# Patient Record
Sex: MALE | Race: WHITE | NOT HISPANIC OR LATINO | Employment: FULL TIME | URBAN - METROPOLITAN AREA
[De-identification: names, ages, dates, MRNs, and addresses within clinical notes are randomized per-mention and may not be internally consistent; named-entity substitution may affect disease eponyms.]

---

## 2017-06-06 ENCOUNTER — HISTORICAL (OUTPATIENT)
Dept: INTENSIVE CARE | Facility: HOSPITAL | Age: 57
End: 2017-06-06

## 2017-12-05 ENCOUNTER — HISTORICAL (OUTPATIENT)
Dept: ADMINISTRATIVE | Facility: HOSPITAL | Age: 57
End: 2017-12-05

## 2017-12-05 LAB
CHOLEST SERPL-MCNC: 207 MG/DL (ref 0–200)
CHOLEST/HDLC SERPL: 3.9 {RATIO} (ref 0–5)
HDLC SERPL-MCNC: 53 MG/DL (ref 35–60)
LDLC SERPL CALC-MCNC: 136 MG/DL (ref 0–129)
TRIGL SERPL-MCNC: 91 MG/DL (ref 30–150)
VLDLC SERPL CALC-MCNC: 18 MG/DL

## 2018-08-15 ENCOUNTER — HISTORICAL (OUTPATIENT)
Dept: ADMINISTRATIVE | Facility: HOSPITAL | Age: 58
End: 2018-08-15

## 2018-08-15 LAB
CHOLEST SERPL-MCNC: 213 MG/DL (ref 0–200)
CHOLEST/HDLC SERPL: 4.1 {RATIO} (ref 0–5)
HDLC SERPL-MCNC: 52 MG/DL (ref 35–60)
LDLC SERPL CALC-MCNC: 127 MG/DL (ref 0–129)
TRIGL SERPL-MCNC: 168 MG/DL (ref 30–150)
VLDLC SERPL CALC-MCNC: 34 MG/DL

## 2019-05-21 ENCOUNTER — HISTORICAL (OUTPATIENT)
Dept: ADMINISTRATIVE | Facility: HOSPITAL | Age: 59
End: 2019-05-21

## 2019-05-21 LAB
CHOLEST SERPL-MCNC: 200 MG/DL (ref 0–200)
CHOLEST/HDLC SERPL: 3.3 {RATIO} (ref 0–5)
HDLC SERPL-MCNC: 60 MG/DL (ref 35–60)
LDLC SERPL CALC-MCNC: 114 MG/DL (ref 0–129)
TRIGL SERPL-MCNC: 128 MG/DL (ref 30–150)
VLDLC SERPL CALC-MCNC: 26 MG/DL

## 2020-09-23 ENCOUNTER — HISTORICAL (OUTPATIENT)
Dept: INTENSIVE CARE | Facility: HOSPITAL | Age: 60
End: 2020-09-23

## 2022-08-10 DIAGNOSIS — J31.0 RHINITIS, UNSPECIFIED TYPE: Primary | ICD-10-CM

## 2022-09-06 ENCOUNTER — PROCEDURE VISIT (OUTPATIENT)
Dept: SLEEP MEDICINE | Facility: HOSPITAL | Age: 62
End: 2022-09-06
Attending: SPECIALIST
Payer: COMMERCIAL

## 2022-09-06 ENCOUNTER — OFFICE VISIT (OUTPATIENT)
Dept: NEUROLOGY | Facility: CLINIC | Age: 62
End: 2022-09-06
Payer: COMMERCIAL

## 2022-09-06 VITALS
HEIGHT: 71 IN | DIASTOLIC BLOOD PRESSURE: 82 MMHG | BODY MASS INDEX: 26.6 KG/M2 | SYSTOLIC BLOOD PRESSURE: 138 MMHG | WEIGHT: 190 LBS

## 2022-09-06 DIAGNOSIS — G47.33 OBSTRUCTIVE SLEEP APNEA SYNDROME: ICD-10-CM

## 2022-09-06 DIAGNOSIS — J31.0 RHINITIS, UNSPECIFIED TYPE: ICD-10-CM

## 2022-09-06 DIAGNOSIS — Z78.9 INTOLERANCE OF CONTINUOUS POSITIVE AIRWAY PRESSURE (CPAP) VENTILATION: ICD-10-CM

## 2022-09-06 DIAGNOSIS — G47.33 OBSTRUCTIVE SLEEP APNEA SYNDROME: Primary | ICD-10-CM

## 2022-09-06 PROCEDURE — 99214 PR OFFICE/OUTPT VISIT, EST, LEVL IV, 30-39 MIN: ICD-10-PCS | Mod: S$GLB,,, | Performed by: SPECIALIST

## 2022-09-06 PROCEDURE — 3079F PR MOST RECENT DIASTOLIC BLOOD PRESSURE 80-89 MM HG: ICD-10-PCS | Mod: CPTII,S$GLB,, | Performed by: SPECIALIST

## 2022-09-06 PROCEDURE — 3075F PR MOST RECENT SYSTOLIC BLOOD PRESS GE 130-139MM HG: ICD-10-PCS | Mod: CPTII,S$GLB,, | Performed by: SPECIALIST

## 2022-09-06 PROCEDURE — 99999 PR PBB SHADOW E&M-EST. PATIENT-LVL III: ICD-10-PCS | Mod: PBBFAC,,, | Performed by: SPECIALIST

## 2022-09-06 PROCEDURE — 4010F PR ACE/ARB THEARPY RXD/TAKEN: ICD-10-PCS | Mod: CPTII,S$GLB,, | Performed by: SPECIALIST

## 2022-09-06 PROCEDURE — 4010F ACE/ARB THERAPY RXD/TAKEN: CPT | Mod: CPTII,S$GLB,, | Performed by: SPECIALIST

## 2022-09-06 PROCEDURE — 95806 PR SLEEP STUDY, UNATTENDED, SIMUL RECORD HR/O2 SAT/RESP FLOW/RESP EFFT: ICD-10-PCS | Mod: 26,,, | Performed by: SPECIALIST

## 2022-09-06 PROCEDURE — 99999 PR PBB SHADOW E&M-EST. PATIENT-LVL III: CPT | Mod: PBBFAC,,, | Performed by: SPECIALIST

## 2022-09-06 PROCEDURE — 99214 OFFICE O/P EST MOD 30 MIN: CPT | Mod: S$GLB,,, | Performed by: SPECIALIST

## 2022-09-06 PROCEDURE — 3075F SYST BP GE 130 - 139MM HG: CPT | Mod: CPTII,S$GLB,, | Performed by: SPECIALIST

## 2022-09-06 PROCEDURE — 95806 SLEEP STUDY UNATT&RESP EFFT: CPT

## 2022-09-06 PROCEDURE — 1159F MED LIST DOCD IN RCRD: CPT | Mod: CPTII,S$GLB,, | Performed by: SPECIALIST

## 2022-09-06 PROCEDURE — 3008F BODY MASS INDEX DOCD: CPT | Mod: CPTII,S$GLB,, | Performed by: SPECIALIST

## 2022-09-06 PROCEDURE — 3008F PR BODY MASS INDEX (BMI) DOCUMENTED: ICD-10-PCS | Mod: CPTII,S$GLB,, | Performed by: SPECIALIST

## 2022-09-06 PROCEDURE — 3079F DIAST BP 80-89 MM HG: CPT | Mod: CPTII,S$GLB,, | Performed by: SPECIALIST

## 2022-09-06 PROCEDURE — 1159F PR MEDICATION LIST DOCUMENTED IN MEDICAL RECORD: ICD-10-PCS | Mod: CPTII,S$GLB,, | Performed by: SPECIALIST

## 2022-09-06 PROCEDURE — 95806 SLEEP STUDY UNATT&RESP EFFT: CPT | Mod: 26,,, | Performed by: SPECIALIST

## 2022-09-06 NOTE — PROGRESS NOTES
"Subjective:         Patient ID: Harjinder Jiang is a 61 y.o. male.    Chief Complaint:willard; hx pap nonadherent or intolerance     HPI:           new patient sleep (New patient referred by Dr Stanley for sleep. Patient gets 6-7 hours of sleep per night sometimes taking a 2 hour nap. Awakens from sleep SOB/snoring, snores loudly enough for others to complain and has EDS. Had home sleep study done with Dr Denise camargo 5 years ago. Patient has CPAP machine but unable tolerate it.)  Two maxillary surgeries 2020; one prior to last hst and one afterward   Wears a mouthpiece which has helped   He and wife sleep separate bedrooms     notes may also be on facesheet for HPI, ROS, and other sections     Review of Systems      Comments from [facility] prior visit(s):  ...          Social History     Socioeconomic History    Marital status:      Spouse name: Nathaly    Number of children: 1   Occupational History    Occupation: Manager at [employer]   Tobacco Use    Smoking status: Never    Smokeless tobacco: Never   Substance and Sexual Activity    Alcohol use: Yes     Comment: 2-3 beers, 3-5 times per week    Drug use: Never         Current Outpatient Medications:     aspirin 81 MG Chew, Take 81 mg by mouth once daily., Disp: , Rfl:     atorvastatin (LIPITOR) 40 MG tablet, Take 40 mg by mouth once daily., Disp: , Rfl:     fluticasone propionate (FLONASE) 50 mcg/actuation nasal spray, 2 sprays by Each Nostril route once daily., Disp: , Rfl:     olmesartan (BENICAR) 40 MG tablet, Take 40 mg by mouth once daily., Disp: , Rfl:     tadalafiL (CIALIS) 5 MG tablet, Take 5 mg by mouth daily as needed., Disp: , Rfl:      Objective:      Exam  /82 (BP Location: Left arm, Patient Position: Sitting)   Ht 5' 11" (1.803 m)   Wt 86.2 kg (190 lb)   BMI 26.50 kg/m²     General:   __unacccompanied       accompanied, by:_    Heart__ RRR  pharynx: m 3    Neurological  Speech: normal   cranial nerves:  vis fields:  EOMs: " normal   funduscopic:  Motor: normal   coord:   Gait: indep      Sleep study data or PAP adherence data:   Hst 9/2020  RDI 8; dann 82%  2.4 min <88%     meds:          Assessment/Plan:       Problem List Items Addressed This Visit          Pulmonary    Intolerance of continuous positive airway pressure (CPAP) ventilation       Other    Obstructive sleep apnea syndrome - Primary     Other Visit Diagnoses       Rhinitis, unspecified type                Other comments/ follow up:          Orders Placed This Encounter   Procedures    Home Sleep Studies   Hst with appliance    Inspire could be option if apnea worse than had been despite appliance            MD FATOU AvilaA

## 2022-09-28 PROBLEM — Z91.09 ENVIRONMENTAL ALLERGIES: Status: ACTIVE | Noted: 2022-09-28

## 2022-09-29 PROBLEM — Z12.11 COLON CANCER SCREENING: Status: ACTIVE | Noted: 2022-09-29

## 2022-09-29 PROBLEM — Z23 IMMUNIZATION DUE: Status: ACTIVE | Noted: 2022-09-29

## 2022-10-10 ENCOUNTER — OFFICE VISIT (OUTPATIENT)
Dept: NEUROLOGY | Facility: CLINIC | Age: 62
End: 2022-10-10
Payer: COMMERCIAL

## 2022-10-10 VITALS
SYSTOLIC BLOOD PRESSURE: 144 MMHG | DIASTOLIC BLOOD PRESSURE: 86 MMHG | HEIGHT: 71 IN | BODY MASS INDEX: 26.46 KG/M2 | WEIGHT: 189 LBS

## 2022-10-10 DIAGNOSIS — Z78.9 INTOLERANCE OF CONTINUOUS POSITIVE AIRWAY PRESSURE (CPAP) VENTILATION: Primary | ICD-10-CM

## 2022-10-10 DIAGNOSIS — I10 PRIMARY HYPERTENSION: ICD-10-CM

## 2022-10-10 PROCEDURE — 3077F PR MOST RECENT SYSTOLIC BLOOD PRESSURE >= 140 MM HG: ICD-10-PCS | Mod: CPTII,S$GLB,, | Performed by: NURSE PRACTITIONER

## 2022-10-10 PROCEDURE — 4010F ACE/ARB THERAPY RXD/TAKEN: CPT | Mod: CPTII,S$GLB,, | Performed by: NURSE PRACTITIONER

## 2022-10-10 PROCEDURE — 99999 PR PBB SHADOW E&M-EST. PATIENT-LVL III: CPT | Mod: PBBFAC,,, | Performed by: NURSE PRACTITIONER

## 2022-10-10 PROCEDURE — 99213 PR OFFICE/OUTPT VISIT, EST, LEVL III, 20-29 MIN: ICD-10-PCS | Mod: S$GLB,,, | Performed by: NURSE PRACTITIONER

## 2022-10-10 PROCEDURE — 1159F MED LIST DOCD IN RCRD: CPT | Mod: CPTII,S$GLB,, | Performed by: NURSE PRACTITIONER

## 2022-10-10 PROCEDURE — 4010F PR ACE/ARB THEARPY RXD/TAKEN: ICD-10-PCS | Mod: CPTII,S$GLB,, | Performed by: NURSE PRACTITIONER

## 2022-10-10 PROCEDURE — 3077F SYST BP >= 140 MM HG: CPT | Mod: CPTII,S$GLB,, | Performed by: NURSE PRACTITIONER

## 2022-10-10 PROCEDURE — 1159F PR MEDICATION LIST DOCUMENTED IN MEDICAL RECORD: ICD-10-PCS | Mod: CPTII,S$GLB,, | Performed by: NURSE PRACTITIONER

## 2022-10-10 PROCEDURE — 3008F BODY MASS INDEX DOCD: CPT | Mod: CPTII,S$GLB,, | Performed by: NURSE PRACTITIONER

## 2022-10-10 PROCEDURE — 99213 OFFICE O/P EST LOW 20 MIN: CPT | Mod: S$GLB,,, | Performed by: NURSE PRACTITIONER

## 2022-10-10 PROCEDURE — 99999 PR PBB SHADOW E&M-EST. PATIENT-LVL III: ICD-10-PCS | Mod: PBBFAC,,, | Performed by: NURSE PRACTITIONER

## 2022-10-10 PROCEDURE — 1160F RVW MEDS BY RX/DR IN RCRD: CPT | Mod: CPTII,S$GLB,, | Performed by: NURSE PRACTITIONER

## 2022-10-10 PROCEDURE — 3008F PR BODY MASS INDEX (BMI) DOCUMENTED: ICD-10-PCS | Mod: CPTII,S$GLB,, | Performed by: NURSE PRACTITIONER

## 2022-10-10 PROCEDURE — 3079F PR MOST RECENT DIASTOLIC BLOOD PRESSURE 80-89 MM HG: ICD-10-PCS | Mod: CPTII,S$GLB,, | Performed by: NURSE PRACTITIONER

## 2022-10-10 PROCEDURE — 1160F PR REVIEW ALL MEDS BY PRESCRIBER/CLIN PHARMACIST DOCUMENTED: ICD-10-PCS | Mod: CPTII,S$GLB,, | Performed by: NURSE PRACTITIONER

## 2022-10-10 PROCEDURE — 3079F DIAST BP 80-89 MM HG: CPT | Mod: CPTII,S$GLB,, | Performed by: NURSE PRACTITIONER

## 2022-10-10 NOTE — LETTER
October 10, 2022        Daryn Flores MD  [id number] [facility] [id number]             [facility] [address] , [address]  [facility] [address]  Phone: [phone]   Patient: Harjinder Jiang   MR Number: [id number]   YOB: 1960   Date of Visit: 10/10/2022     Dear Dr. Flores,     Please see attached notes and HST results.   Please see EKG on HST possible PVC vs SVT.    Sincerely,      Subhash Davis, [facility]-BC            CC  No Recipients    Enclosure

## 2022-10-10 NOTE — ASSESSMENT & PLAN NOTE
TRISTIAN 4, Amadou 86%, half minute of hypoxemia during HST. This was done while wearing his oral appliance; breathing acceptable with such appliance and I suggests he continue utilizing the oral appliance that he received from his dentist.     Frequent PVC's and SVT present; will send copy of HST to Dr. Flores for further review.    FU here PRN; if he decides to resume PAP, he may, I'd just ask that he let us know so that we can schedule a FU accordingly.

## 2022-10-10 NOTE — PROGRESS NOTES
"Subjective:           Patient ID: Harjinder Jiang is a 61 y.o. male.    Chief Complaint: F/U GAIL/HST results    HPI:            Sleeps 6-7 hrs a night and awakens 2 times due to nocturia and it takes a while for him to go back to sleep. Awakens un refreshed and denies EDS. Naps on the weekends for abt 30 min.     HST results:  TRISTIAN 4  Amadou 86%  < 88% 0.5 minutes    ROS: as per HPI, otherwise pertinent systems review is negative          Past Medical History:   Diagnosis Date    HTN (hypertension)        Past Surgical History:   Procedure Laterality Date    Colpocleisis ([place] type)   12/01/2020    FESS sphenoidotomy  2003    LAMINECTOMY  1992    [place] II fracture   07/01/2020    TONSILLECTOMY AND ADENOIDECTOMY  1965       Family History   Problem Relation Age of Onset    No Known Problems Mother     Heart disease Father     Hypertension Father     Hypertension Sister     Hypertension Sister     Atrial fibrillation Brother        Social History     Socioeconomic History    Marital status:      Spouse name: Nathaly    Number of children: 1   Occupational History    Occupation: Manager at [employer]   Tobacco Use    Smoking status: Never    Smokeless tobacco: Never   Substance and Sexual Activity    Alcohol use: Yes     Comment: 2-3 beers, 3-5 times per week    Drug use: Never       Review of patient's allergies indicates:  No Known Allergies      Current Outpatient Medications:     aspirin (ECOTRIN) 81 MG EC tablet, Take 81 mg by mouth once daily., Disp: , Rfl:     atorvastatin (LIPITOR) 40 MG tablet, Take 40 mg by mouth once daily., Disp: , Rfl:     fluticasone propionate (FLONASE) 50 mcg/actuation nasal spray, 2 sprays by Each Nostril route once daily., Disp: , Rfl:     olmesartan (BENICAR) 40 MG tablet, Take 40 mg by mouth once daily., Disp: , Rfl:     tadalafiL (CIALIS) 5 MG tablet, Take 5 mg by mouth daily as needed., Disp: , Rfl:          Objective:      Exam:   /86   Ht 5' 11" " (1.803 m)   Wt 85.7 kg (189 lb)   BMI 26.36 kg/m²     Physical Exam  Vitals reviewed.   Constitutional:       Appearance: Normal appearance.      Accompanied by: alone   HENT:      Ears:      Comments: Hearing normal.  Eyes:      Extraocular Movements: Extraocular movements intact.   Cardiovascular:      Rate and Rhythm: Normal rate and regular rhythm.   Pulmonary:      Effort: Pulmonary effort is normal.      Breath sounds: Normal breath sounds.   Musculoskeletal:         General: Normal range of motion.   Skin:     General: Skin is warm and dry.   Neurological:      General: No focal deficit present.      Mental Status: He is alert and oriented to person, place, and time.      Gait unassisted and normal.  Psychiatric:         Mood and Affect: Mood normal.         Behavior: Behavior normal.         Assessment/Plan:     Problem List Items Addressed This Visit          Pulmonary    Intolerance of continuous positive airway pressure (CPAP) ventilation - Primary    Current Assessment & Plan     TRISTIAN 4, Amadou 86%, half minute of hypoxemia during HST. This was done while wearing his oral appliance; breathing acceptable with such appliance and I suggests he continue utilizing the oral appliance that he received from his dentist.     Frequent PVC's and SVT present; will send copy of HST to Dr. Flores for further review.    FU here PRN; if he decides to resume PAP, he may, I'd just ask that he let us know so that we can schedule a FU accordingly.             Cardiac/Vascular    Primary hypertension    Current Assessment & Plan     Diet, exercise and weight loss encouraged.    He c/o HTN every morning when waking despite stellar compliance with antihypertensive. Will send copy of today's OV note to cardiologist Dr. Flores for further review/consideration.               Subhash Davis, MSN, APRN, AGACNP-BC

## 2022-10-10 NOTE — LETTER
October 10, 2022        Juni Kaplan MD  [address] [place] [address].  [place] [id number]             [facility] [address] , [address]  [place] [address]  Phone: [phone]   Patient: Harjinder Jiang   MR Number: [id number]   YOB: 1960   Date of Visit: 10/10/2022     Dear Dr. Kaplan,     Please see attached notes and HST results on mutual patient.    Sincerely,      AIDA Taylor-BC            CC  Mikhail Stanley MD    Enclosure

## 2022-10-10 NOTE — ASSESSMENT & PLAN NOTE
Diet, exercise and weight loss encouraged.    He c/o HTN every morning when waking despite stellar compliance with antihypertensive. Will send copy of today's OV note to cardiologist Dr. Flores for further review/consideration.

## 2022-12-13 ENCOUNTER — PATIENT MESSAGE (OUTPATIENT)
Dept: RESEARCH | Facility: HOSPITAL | Age: 62
End: 2022-12-13
Payer: COMMERCIAL

## 2023-04-02 PROBLEM — E78.00 HYPERCHOLESTEREMIA: Status: ACTIVE | Noted: 2023-04-02

## 2023-04-02 PROBLEM — Z00.00 ENCOUNTER FOR WELLNESS EXAMINATION IN ADULT: Status: ACTIVE | Noted: 2023-04-02

## 2023-07-10 PROBLEM — Z00.00 ENCOUNTER FOR WELLNESS EXAMINATION IN ADULT: Status: RESOLVED | Noted: 2023-04-02 | Resolved: 2023-07-10

## 2023-11-09 PROBLEM — J06.9 UPPER RESPIRATORY TRACT INFECTION: Status: ACTIVE | Noted: 2023-11-09
